# Patient Record
Sex: FEMALE | Race: WHITE
[De-identification: names, ages, dates, MRNs, and addresses within clinical notes are randomized per-mention and may not be internally consistent; named-entity substitution may affect disease eponyms.]

---

## 2018-12-10 ENCOUNTER — HOSPITAL ENCOUNTER (OUTPATIENT)
Dept: HOSPITAL 92 - BICMAMMO | Age: 73
Discharge: HOME | End: 2018-12-10
Attending: INTERNAL MEDICINE
Payer: MEDICARE

## 2018-12-10 DIAGNOSIS — N63.20: ICD-10-CM

## 2018-12-10 DIAGNOSIS — Z12.31: Primary | ICD-10-CM

## 2018-12-10 PROCEDURE — 77067 SCR MAMMO BI INCL CAD: CPT

## 2018-12-10 PROCEDURE — 77063 BREAST TOMOSYNTHESIS BI: CPT

## 2018-12-19 ENCOUNTER — HOSPITAL ENCOUNTER (OUTPATIENT)
Dept: HOSPITAL 92 - BICMAMMO | Age: 73
Discharge: HOME | End: 2018-12-19
Attending: INTERNAL MEDICINE
Payer: MEDICARE

## 2018-12-19 DIAGNOSIS — N63.20: Primary | ICD-10-CM

## 2018-12-19 PROCEDURE — G0279 TOMOSYNTHESIS, MAMMO: HCPCS

## 2018-12-19 PROCEDURE — 77065 DX MAMMO INCL CAD UNI: CPT

## 2018-12-19 PROCEDURE — 76642 ULTRASOUND BREAST LIMITED: CPT

## 2018-12-19 NOTE — ULT
LEFT BREAST ULTRASOUND:

 

HISTORY:

Abnormal mammogram.

 

CORRELATION:

Mammograms from 12/10/2018 and today.

 

FINDINGS:

Sonographic evaluation of the left anterior breast demonstrates a 5 mm cyst at the 2 o'clock position
, corresponding to the mammographic finding.

 

IMPRESSION:

BI-RADS category 2-Benign findings.

 

Return to annual mammographic screening.

 

POS: OFF

## 2019-02-17 ENCOUNTER — HOSPITAL ENCOUNTER (EMERGENCY)
Dept: HOSPITAL 92 - ERS | Age: 74
Discharge: HOME | End: 2019-02-17
Payer: MEDICARE

## 2019-02-17 DIAGNOSIS — J01.90: Primary | ICD-10-CM

## 2019-02-17 DIAGNOSIS — Z79.899: ICD-10-CM

## 2019-02-17 DIAGNOSIS — I10: ICD-10-CM

## 2019-02-17 DIAGNOSIS — E78.5: ICD-10-CM

## 2019-02-17 DIAGNOSIS — E03.9: ICD-10-CM

## 2019-02-17 LAB
ANION GAP SERPL CALC-SCNC: 17 MMOL/L (ref 10–20)
BUN SERPL-MCNC: 10 MG/DL (ref 9.8–20.1)
CALCIUM SERPL-MCNC: 9.6 MG/DL (ref 7.8–10.44)
CHLORIDE SERPL-SCNC: 104 MMOL/L (ref 98–107)
CO2 SERPL-SCNC: 23 MMOL/L (ref 23–31)
CREAT CL PREDICTED SERPL C-G-VRATE: 0 ML/MIN (ref 70–130)
CRP SERPL-MCNC: 7.6 MG/DL
GLUCOSE SERPL-MCNC: 94 MG/DL (ref 83–110)
HGB BLD-MCNC: 14 G/DL (ref 12–16)
MCH RBC QN AUTO: 29.9 PG (ref 27–31)
MCV RBC AUTO: 94.3 FL (ref 78–98)
MDIFF COMPLETE?: YES
PLATELET # BLD AUTO: 254 THOU/UL (ref 130–400)
POTASSIUM SERPL-SCNC: 4 MMOL/L (ref 3.5–5.1)
RBC # BLD AUTO: 4.68 MILL/UL (ref 4.2–5.4)
SODIUM SERPL-SCNC: 140 MMOL/L (ref 136–145)
WBC # BLD AUTO: 9 THOU/UL (ref 4.8–10.8)

## 2019-02-17 PROCEDURE — 80048 BASIC METABOLIC PNL TOTAL CA: CPT

## 2019-02-17 PROCEDURE — 85652 RBC SED RATE AUTOMATED: CPT

## 2019-02-17 PROCEDURE — 96365 THER/PROPH/DIAG IV INF INIT: CPT

## 2019-02-17 PROCEDURE — 96366 THER/PROPH/DIAG IV INF ADDON: CPT

## 2019-02-17 PROCEDURE — 86140 C-REACTIVE PROTEIN: CPT

## 2019-02-17 PROCEDURE — 85025 COMPLETE CBC W/AUTO DIFF WBC: CPT

## 2019-02-17 PROCEDURE — 96375 TX/PRO/DX INJ NEW DRUG ADDON: CPT

## 2019-02-17 PROCEDURE — 70450 CT HEAD/BRAIN W/O DYE: CPT

## 2019-02-17 NOTE — CT
HEAD CT NONCONTRAST:

 

Date  02/17/19 

 

INDICATION:

Persistent headache, localized to frontal region. 

 

FINDINGS:

There is no acute intracranial hemorrhage, mass effect, or midline shift. Ventricular system is mildl
y prominent and may be due to a slight degree of generalized parenchymal volume loss. There is mild c
hronic ischemic disease of the cerebral white matter. There is diffuse opacification of the right eth
moid and maxillary sinuses. Frontal ethmoidal recess is also opacified. 

 

IMPRESSION: 

1.  No acute intracranial hemorrhage or mass effect. 

 

2.  Prominent right side paranasal sinus opacification, involving ethmoid and right maxillary sinus. 
This could correlate to patient's frontal headaches. Correlate clinically. 

 

3.  Mild chronic microvascular ischemic disease. 

 

 

POS: [facility]

## 2019-02-27 ENCOUNTER — HOSPITAL ENCOUNTER (EMERGENCY)
Dept: HOSPITAL 92 - ERS | Age: 74
Discharge: NURSING, REHAB OR LONG-TERM CARE | End: 2019-02-27
Payer: MEDICARE

## 2019-02-27 DIAGNOSIS — I10: ICD-10-CM

## 2019-02-27 DIAGNOSIS — Z79.899: ICD-10-CM

## 2019-02-27 DIAGNOSIS — Z87.891: ICD-10-CM

## 2019-02-27 DIAGNOSIS — E03.9: ICD-10-CM

## 2019-02-27 DIAGNOSIS — J10.1: Primary | ICD-10-CM

## 2019-02-27 DIAGNOSIS — E78.5: ICD-10-CM

## 2019-02-27 LAB
ALBUMIN SERPL BCG-MCNC: 3.7 G/DL (ref 3.4–4.8)
ALP SERPL-CCNC: 110 U/L (ref 40–150)
ALT SERPL W P-5'-P-CCNC: 21 U/L (ref 8–55)
ANION GAP SERPL CALC-SCNC: 18 MMOL/L (ref 10–20)
AST SERPL-CCNC: 34 U/L (ref 5–34)
BASOPHILS # BLD AUTO: 0.1 THOU/UL (ref 0–0.2)
BASOPHILS NFR BLD AUTO: 0.8 % (ref 0–1)
BILIRUB SERPL-MCNC: 0.3 MG/DL (ref 0.2–1.2)
BUN SERPL-MCNC: 24 MG/DL (ref 9.8–20.1)
CALCIUM SERPL-MCNC: 9.7 MG/DL (ref 7.8–10.44)
CHLORIDE SERPL-SCNC: 101 MMOL/L (ref 98–107)
CO2 SERPL-SCNC: 27 MMOL/L (ref 23–31)
CREAT CL PREDICTED SERPL C-G-VRATE: 0 ML/MIN (ref 70–130)
EOSINOPHIL # BLD AUTO: 0 THOU/UL (ref 0–0.7)
EOSINOPHIL NFR BLD AUTO: 0.4 % (ref 0–10)
GLOBULIN SER CALC-MCNC: 4.1 G/DL (ref 2.4–3.5)
GLUCOSE SERPL-MCNC: 100 MG/DL (ref 83–110)
HGB BLD-MCNC: 15.3 G/DL (ref 12–16)
LYMPHOCYTES # BLD: 1.5 THOU/UL (ref 1.2–3.4)
LYMPHOCYTES NFR BLD AUTO: 22.3 % (ref 21–51)
MCH RBC QN AUTO: 29.2 PG (ref 27–31)
MCV RBC AUTO: 90.3 FL (ref 78–98)
MONOCYTES # BLD AUTO: 0.8 THOU/UL (ref 0.11–0.59)
MONOCYTES NFR BLD AUTO: 12.4 % (ref 0–10)
NEUTROPHILS # BLD AUTO: 4.3 THOU/UL (ref 1.4–6.5)
NEUTROPHILS NFR BLD AUTO: 64.2 % (ref 42–75)
PLATELET # BLD AUTO: 297 THOU/UL (ref 130–400)
POTASSIUM SERPL-SCNC: 4.2 MMOL/L (ref 3.5–5.1)
RBC # BLD AUTO: 5.24 MILL/UL (ref 4.2–5.4)
SODIUM SERPL-SCNC: 142 MMOL/L (ref 136–145)
WBC # BLD AUTO: 6.8 THOU/UL (ref 4.8–10.8)

## 2019-02-27 PROCEDURE — 87804 INFLUENZA ASSAY W/OPTIC: CPT

## 2019-02-27 PROCEDURE — C9113 INJ PANTOPRAZOLE SODIUM, VIA: HCPCS

## 2019-02-27 PROCEDURE — 96361 HYDRATE IV INFUSION ADD-ON: CPT

## 2019-02-27 PROCEDURE — 96375 TX/PRO/DX INJ NEW DRUG ADDON: CPT

## 2019-02-27 PROCEDURE — 96374 THER/PROPH/DIAG INJ IV PUSH: CPT

## 2019-02-27 PROCEDURE — 80053 COMPREHEN METABOLIC PANEL: CPT

## 2019-02-27 PROCEDURE — 84484 ASSAY OF TROPONIN QUANT: CPT

## 2019-02-27 PROCEDURE — 85025 COMPLETE CBC W/AUTO DIFF WBC: CPT

## 2022-10-25 ENCOUNTER — HOSPITAL ENCOUNTER (OUTPATIENT)
Dept: HOSPITAL 92 - CSHLAB | Age: 77
Discharge: HOME | End: 2022-10-25
Attending: SPECIALIST
Payer: MEDICARE

## 2022-10-25 DIAGNOSIS — Z01.812: Primary | ICD-10-CM

## 2022-10-25 LAB
ALBUMIN SERPL BCG-MCNC: 3.9 G/DL (ref 3.4–4.8)
ALP SERPL-CCNC: 117 U/L (ref 40–110)
ALT SERPL W P-5'-P-CCNC: 8 U/L (ref 8–55)
ANION GAP SERPL CALC-SCNC: 15 MMOL/L (ref 10–20)
APTT PPP: 26.6 SEC (ref 22–33)
AST SERPL-CCNC: 14 U/L (ref 5–34)
BILIRUB SERPL-MCNC: 0.7 MG/DL (ref 0.2–1.2)
BUN SERPL-MCNC: 19 MG/DL (ref 9.8–20.1)
CALCIUM SERPL-MCNC: 9.7 MG/DL (ref 7.8–10.44)
CHLORIDE SERPL-SCNC: 103 MMOL/L (ref 98–107)
CO2 SERPL-SCNC: 28 MMOL/L (ref 23–31)
CREAT CL PREDICTED SERPL C-G-VRATE: 0 ML/MIN (ref 70–130)
GLOBULIN SER CALC-MCNC: 2.9 G/DL (ref 2.4–3.5)
GLUCOSE SERPL-MCNC: 100 MG/DL (ref 83–110)
HGB BLD-MCNC: 14.6 G/DL (ref 12–15.5)
INR PPP: 1
MAGNESIUM SERPL-MCNC: 2.2 MG/DL (ref 1.6–2.6)
MCH RBC QN AUTO: 29.9 PG (ref 27–33)
MCV RBC AUTO: 91.4 FL (ref 81.6–98.3)
PLATELET # BLD AUTO: 224 10X3/UL (ref 150–450)
POTASSIUM SERPL-SCNC: 4.2 MMOL/L (ref 3.5–5.1)
PROTHROMBIN TIME: 10.7 SEC (ref 9.5–12.1)
RBC # BLD AUTO: 4.89 10X6/UL (ref 3.9–5.03)
SODIUM SERPL-SCNC: 142 MMOL/L (ref 136–145)
WBC # BLD AUTO: 8.9 10X3/UL (ref 3.5–10.5)

## 2022-10-25 PROCEDURE — 83735 ASSAY OF MAGNESIUM: CPT

## 2022-10-25 PROCEDURE — 85027 COMPLETE CBC AUTOMATED: CPT

## 2022-10-25 PROCEDURE — 85610 PROTHROMBIN TIME: CPT

## 2022-10-25 PROCEDURE — 80053 COMPREHEN METABOLIC PANEL: CPT

## 2022-10-25 PROCEDURE — 85730 THROMBOPLASTIN TIME PARTIAL: CPT

## 2022-10-28 ENCOUNTER — HOSPITAL ENCOUNTER (OUTPATIENT)
Dept: HOSPITAL 92 - CSHSDC | Age: 77
Discharge: HOME | End: 2022-10-28
Attending: SPECIALIST
Payer: MEDICARE

## 2022-10-28 VITALS — TEMPERATURE: 97.5 F | DIASTOLIC BLOOD PRESSURE: 94 MMHG | SYSTOLIC BLOOD PRESSURE: 136 MMHG

## 2022-10-28 DIAGNOSIS — I45.10: ICD-10-CM

## 2022-10-28 DIAGNOSIS — I48.91: Primary | ICD-10-CM

## 2022-10-28 PROCEDURE — 93010 ELECTROCARDIOGRAM REPORT: CPT

## 2022-10-28 PROCEDURE — 93005 ELECTROCARDIOGRAM TRACING: CPT

## 2022-10-28 PROCEDURE — 92960 CARDIOVERSION ELECTRIC EXT: CPT

## 2023-05-02 ENCOUNTER — HOSPITAL ENCOUNTER (OUTPATIENT)
Dept: HOSPITAL 92 - CSHSDC | Age: 78
End: 2023-05-02
Attending: SPECIALIST
Payer: MEDICARE

## 2023-05-02 DIAGNOSIS — Z79.82: ICD-10-CM

## 2023-05-02 DIAGNOSIS — Z90.89: ICD-10-CM

## 2023-05-02 DIAGNOSIS — I48.0: Primary | ICD-10-CM

## 2023-05-02 DIAGNOSIS — I10: ICD-10-CM

## 2023-05-02 DIAGNOSIS — I25.118: ICD-10-CM

## 2023-05-02 DIAGNOSIS — G43.909: ICD-10-CM

## 2023-05-02 DIAGNOSIS — I45.10: ICD-10-CM

## 2023-05-02 DIAGNOSIS — Z88.8: ICD-10-CM

## 2023-05-02 DIAGNOSIS — Z88.6: ICD-10-CM

## 2023-05-02 DIAGNOSIS — E66.9: ICD-10-CM

## 2023-05-02 DIAGNOSIS — E78.5: ICD-10-CM

## 2023-05-02 DIAGNOSIS — G47.00: ICD-10-CM

## 2023-05-02 DIAGNOSIS — I87.2: ICD-10-CM

## 2023-05-02 DIAGNOSIS — F41.9: ICD-10-CM

## 2023-05-02 DIAGNOSIS — Z79.890: ICD-10-CM

## 2023-05-02 DIAGNOSIS — Z88.1: ICD-10-CM

## 2023-05-02 DIAGNOSIS — Z79.899: ICD-10-CM

## 2023-05-02 DIAGNOSIS — K21.9: ICD-10-CM

## 2023-05-02 DIAGNOSIS — Z90.49: ICD-10-CM

## 2023-05-02 DIAGNOSIS — J45.909: ICD-10-CM

## 2023-05-02 LAB
ALBUMIN SERPL BCG-MCNC: 3.7 G/DL (ref 3.4–4.8)
ALP SERPL-CCNC: 86 U/L (ref 40–110)
ALT SERPL W P-5'-P-CCNC: 12 U/L (ref 8–55)
ANION GAP SERPL CALC-SCNC: 15 MMOL/L (ref 10–20)
AST SERPL-CCNC: 14 U/L (ref 5–34)
BASOPHILS # BLD AUTO: 0.1 10X3/UL (ref 0–0.2)
BASOPHILS NFR BLD AUTO: 1.1 % (ref 0–2)
BILIRUB SERPL-MCNC: 0.4 MG/DL (ref 0.2–1.2)
BUN SERPL-MCNC: 19 MG/DL (ref 9.8–20.1)
CALCIUM SERPL-MCNC: 9.3 MG/DL (ref 7.8–10.44)
CHLORIDE SERPL-SCNC: 105 MMOL/L (ref 98–107)
CO2 SERPL-SCNC: 27 MMOL/L (ref 23–31)
CREAT CL PREDICTED SERPL C-G-VRATE: 0 ML/MIN (ref 70–130)
EOSINOPHIL # BLD AUTO: 0.3 10X3/UL (ref 0–0.5)
EOSINOPHIL NFR BLD AUTO: 4.6 % (ref 0–6)
GLOBULIN SER CALC-MCNC: 3 G/DL (ref 2.4–3.5)
GLUCOSE SERPL-MCNC: 89 MG/DL (ref 83–110)
HGB BLD-MCNC: 14.2 G/DL (ref 12–15.5)
INR PPP: 1
LYMPHOCYTES NFR BLD AUTO: 28.9 % (ref 18–47)
MAGNESIUM SERPL-MCNC: 2.3 MG/DL (ref 1.6–2.6)
MCH RBC QN AUTO: 30.7 PG (ref 27–33)
MCV RBC AUTO: 94.6 FL (ref 81.6–98.3)
MONOCYTES # BLD AUTO: 0.5 10X3/UL (ref 0–1.1)
MONOCYTES NFR BLD AUTO: 8.9 % (ref 0–10)
NEUTROPHILS # BLD AUTO: 3.4 10X3/UL (ref 1.5–8.4)
NEUTROPHILS NFR BLD AUTO: 56 % (ref 40–75)
PLATELET # BLD AUTO: 215 10X3/UL (ref 150–450)
POTASSIUM SERPL-SCNC: 3.8 MMOL/L (ref 3.5–5.1)
PROTHROMBIN TIME: 10.8 SEC (ref 9.5–12.1)
RBC # BLD AUTO: 4.62 10X6/UL (ref 3.9–5.03)
SODIUM SERPL-SCNC: 143 MMOL/L (ref 136–145)
WBC # BLD AUTO: 6.1 10X3/UL (ref 3.5–10.5)

## 2023-05-02 PROCEDURE — 93005 ELECTROCARDIOGRAM TRACING: CPT

## 2023-05-02 PROCEDURE — 83735 ASSAY OF MAGNESIUM: CPT

## 2023-05-02 PROCEDURE — 85025 COMPLETE CBC W/AUTO DIFF WBC: CPT

## 2023-05-02 PROCEDURE — 80053 COMPREHEN METABOLIC PANEL: CPT

## 2023-05-02 PROCEDURE — 93010 ELECTROCARDIOGRAM REPORT: CPT

## 2023-05-02 PROCEDURE — 92960 CARDIOVERSION ELECTRIC EXT: CPT

## 2023-05-02 PROCEDURE — 85610 PROTHROMBIN TIME: CPT

## 2023-08-08 ENCOUNTER — HOSPITAL ENCOUNTER (OUTPATIENT)
Dept: HOSPITAL 92 - BICMRI | Age: 78
Discharge: HOME | End: 2023-08-08
Attending: ANESTHESIOLOGY
Payer: MEDICARE

## 2023-08-08 DIAGNOSIS — M50.121: ICD-10-CM

## 2023-08-08 DIAGNOSIS — M50.123: ICD-10-CM

## 2023-08-08 DIAGNOSIS — R60.9: ICD-10-CM

## 2023-08-08 DIAGNOSIS — M54.12: Primary | ICD-10-CM

## 2023-08-08 DIAGNOSIS — M50.122: ICD-10-CM

## 2023-08-08 PROCEDURE — 72141 MRI NECK SPINE W/O DYE: CPT

## 2023-10-29 ENCOUNTER — HOSPITAL ENCOUNTER (EMERGENCY)
Dept: HOSPITAL 92 - ERS | Age: 78
Discharge: HOME | End: 2023-10-29
Payer: MEDICARE

## 2023-10-29 DIAGNOSIS — E03.9: ICD-10-CM

## 2023-10-29 DIAGNOSIS — E78.5: ICD-10-CM

## 2023-10-29 DIAGNOSIS — S70.01XA: Primary | ICD-10-CM

## 2023-10-29 DIAGNOSIS — I10: ICD-10-CM

## 2023-10-29 DIAGNOSIS — Z87.891: ICD-10-CM
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
## 2024-02-15 ENCOUNTER — HOSPITAL ENCOUNTER (OUTPATIENT)
Dept: HOSPITAL 92 - CSHLAB | Age: 79
Discharge: HOME | End: 2024-02-15
Attending: SPECIALIST
Payer: MEDICARE

## 2024-02-15 DIAGNOSIS — Z01.818: Primary | ICD-10-CM

## 2024-02-15 LAB
ALBUMIN SERPL BCG-MCNC: 4.1 G/DL (ref 3.4–4.8)
ALP SERPL-CCNC: 106 U/L (ref 40–110)
ALT SERPL W P-5'-P-CCNC: 14 U/L (ref 8–55)
ANION GAP SERPL CALC-SCNC: 15 MMOL/L (ref 10–20)
AST SERPL-CCNC: 18 U/L (ref 5–34)
BILIRUB SERPL-MCNC: 0.7 MG/DL (ref 0.2–1.2)
BUN SERPL-MCNC: 16 MG/DL (ref 9.8–20.1)
CALCIUM SERPL-MCNC: 9.5 MG/DL (ref 7.8–10.44)
CHLORIDE SERPL-SCNC: 106 MMOL/L (ref 98–107)
CO2 SERPL-SCNC: 25 MMOL/L (ref 23–31)
CREAT CL PREDICTED SERPL C-G-VRATE: 0 ML/MIN (ref 70–130)
GLOBULIN SER CALC-MCNC: 2.8 G/DL (ref 2.4–3.5)
GLUCOSE SERPL-MCNC: 93 MG/DL (ref 83–110)
HCT VFR BLD CALC: 45.3 % (ref 34.9–44.5)
HGB BLD-MCNC: 14.8 G/DL (ref 12–15.5)
INR PPP: 1.1
MAGNESIUM SERPL-MCNC: 2.2 MG/DL (ref 1.6–2.6)
MCH RBC QN AUTO: 30.5 PG (ref 27–33)
MCV RBC AUTO: 93.2 FL (ref 81.6–98.3)
PLATELET # BLD AUTO: 317 10X3/UL (ref 150–450)
POTASSIUM SERPL-SCNC: 4.1 MMOL/L (ref 3.5–5.1)
PROTHROMBIN TIME: 11.4 SEC (ref 9.5–12.1)
RBC # BLD AUTO: 4.86 10X6/UL (ref 3.9–5.03)
SODIUM SERPL-SCNC: 142 MMOL/L (ref 136–145)
WBC # BLD AUTO: 7.3 10X3/UL (ref 3.5–10.5)

## 2024-02-15 PROCEDURE — 83735 ASSAY OF MAGNESIUM: CPT

## 2024-02-15 PROCEDURE — 80053 COMPREHEN METABOLIC PANEL: CPT

## 2024-02-15 PROCEDURE — 93005 ELECTROCARDIOGRAM TRACING: CPT

## 2024-02-15 PROCEDURE — 85610 PROTHROMBIN TIME: CPT

## 2024-02-15 PROCEDURE — 93010 ELECTROCARDIOGRAM REPORT: CPT

## 2024-02-15 PROCEDURE — 85027 COMPLETE CBC AUTOMATED: CPT
